# Patient Record
Sex: FEMALE | Race: WHITE
[De-identification: names, ages, dates, MRNs, and addresses within clinical notes are randomized per-mention and may not be internally consistent; named-entity substitution may affect disease eponyms.]

---

## 2018-05-24 ENCOUNTER — HOSPITAL ENCOUNTER (OUTPATIENT)
Dept: HOSPITAL 68 - STS | Age: 58
Setting detail: OBSERVATION
LOS: 2 days | Discharge: HOME HEALTH | End: 2018-05-26
Attending: ORTHOPAEDIC SURGERY | Admitting: ORTHOPAEDIC SURGERY
Payer: COMMERCIAL

## 2018-05-24 VITALS — HEIGHT: 68 IN | BODY MASS INDEX: 28.49 KG/M2 | WEIGHT: 188 LBS

## 2018-05-24 VITALS — DIASTOLIC BLOOD PRESSURE: 70 MMHG | SYSTOLIC BLOOD PRESSURE: 118 MMHG

## 2018-05-24 VITALS — DIASTOLIC BLOOD PRESSURE: 75 MMHG | SYSTOLIC BLOOD PRESSURE: 120 MMHG

## 2018-05-24 VITALS — DIASTOLIC BLOOD PRESSURE: 60 MMHG | SYSTOLIC BLOOD PRESSURE: 110 MMHG

## 2018-05-24 VITALS — DIASTOLIC BLOOD PRESSURE: 72 MMHG | SYSTOLIC BLOOD PRESSURE: 124 MMHG

## 2018-05-24 VITALS — SYSTOLIC BLOOD PRESSURE: 120 MMHG | DIASTOLIC BLOOD PRESSURE: 80 MMHG

## 2018-05-24 DIAGNOSIS — E78.5: ICD-10-CM

## 2018-05-24 DIAGNOSIS — I10: ICD-10-CM

## 2018-05-24 DIAGNOSIS — F32.9: ICD-10-CM

## 2018-05-24 DIAGNOSIS — Z79.899: ICD-10-CM

## 2018-05-24 DIAGNOSIS — M17.11: Primary | ICD-10-CM

## 2018-05-24 DIAGNOSIS — Z72.89: ICD-10-CM

## 2018-05-24 DIAGNOSIS — G89.18: ICD-10-CM

## 2018-05-24 DIAGNOSIS — Z87.891: ICD-10-CM

## 2018-05-24 PROCEDURE — C9290 INJ, BUPIVACAINE LIPOSOME: HCPCS

## 2018-05-24 PROCEDURE — G0378 HOSPITAL OBSERVATION PER HR: HCPCS

## 2018-05-24 PROCEDURE — C1713 ANCHOR/SCREW BN/BN,TIS/BN: HCPCS

## 2018-05-24 NOTE — ADMISSION CORE MEASURES
Acute Coronary Syndrome (CM)
 
ACS Core Measures
Acute Coronary Syndrome Diagnosis No
 
Congestive Heart Failure (NEW)
 
CHF Core Measures
Congestive Heart Failure Diagnosis No
 
Cerebrovascular Accident (NEW)
 
CVA Core Measures
CVA/TIA Diagnosis No
 
Venous Thromboembolism [id number]
 
VTE Core [place] (View Protocol)
VTE Risk Factors Age>40
No Mechanical VTE Prophylaxis d/t N/A MechProphylax Ordered
No VTE Pharm Prophylaxis d/t NA PharmProphylax ordered
 
Problem List
 
As ranked by this Provider
includes Assessment & Plan
 1. Primary osteoarthritis of right knee
 
 
HOME MEDS
Home Med List
Losartan Potassium (Cozaar) 25 MG TABLET   1 TAB PO DAILY HTN  (Reported)
Rosuvastatin Calcium (Crestor) 10 MG TABLET   1 TAB PO DAILY CHOLESTEROL  (
Reported)
Sertraline HCl (Zoloft) 100 MG TABLET   1 TAB PO DAILY DEPRESSION  (Reported)

## 2018-05-24 NOTE — SURG SHORT-STAY <48HRS DIS SUM
Visit Information
 
Visit Dates
Admission Date:
05/24/18
 
Discharge Date:
05/26/18
 
 
Surgical Short Stay DC Summary
Admission Diagnosis:
Primary right knee unilateral osteoarthritis
Final Diagnosis:
Same status post right total knee arthroplasty
Procedure(s):
Right total knee arthroplasty
Summary/Significant Findings:
Patient underwent right total knee arthroplasty without complications.  She was 
then transferred to the floor, she received infectious prophylaxis, DVT 
prophylaxis with Lovenox. She had a drain placed, which was removed after the 
bloody drainage slowed. She was evaluated and treated by physical therapy. Vital
signs stable, pain was well controlled with oral pain meds. She was discharged 
home with [facility] services in stable condition.
Condition at Discharge:
Good
Discharge Disposition: home health services
Discharge instructions provided to patient/family: Yes
Post discharge follow-up plan:
2 weeks with Dr. oTrres.
Khalida for DVT prophylaxis.

## 2018-05-24 NOTE — PATIENT DISCHARGE INSTRUCTIONS
Discharge Instructions
 
General Discharge Information
You were seen/treated for:
Primary unilateral right knee osteoarthritis
You had these procedures:
Right total knee arthroplasty
Watch for these problems:
Follow-up with Dr. Torres in 2 weeks.
Call the office with any concerns of fever greater than 101.5.  Large amounts of
discharge or drainage from the wound or inability to bear weight on your 
operative side.
You will have a follow-up visit in 2 weeks
You may weight-bear as tolerated.  Activity as tolerated. 
Range of motion as tolerated.  Do not put pillows behind the knee, use a pillow 
under the heel to ensure your are in full knee extension.
Keep the dressing dry as possible, you may shower with the dressing in place 
however, do not take a bath or submerge the wound in water as this will increase
your chance of infection.  Change the dressings after the shower.  You may use 
dry gauze and tape or large Band-Aids
Do not apply any ointments to the wound. 
Due to a  risk of blood clots you'll need to be on Lovenox, 40 mg, once a day 
injection for the next 4-6 weeks.
Take pain medication as directed. 
Apply ice as needed for 20 minutes every hour for the first few days. 
Please take Colace and/or MiraLAX over-the-counter to avoid constipation while 
you're on narcotic pain medication.
 
Do not soak the wound: Yes
No bath, but you may shower: Yes
 
Diet
Continue normal diet: Yes
 
Activity
Full Activity/No Limits: No
Activity Self Limited: Yes
 
Acute Coronary Syndrome
 
Inclusion Criteria
At DC or during hospital stay patient has or had the following:
ACS DIAGNOSIS No
 
Discharge Core Measures
Meds if any: Prescribed or Continued at Discharge
Meds if any: NOT Prescribed or Continued at Discharge
 
Congestive Heart Failure
 
Inclusion Criteria
At DC or during hospital stay patient has or had the following:
CHF DIAGNOSIS No
 
Discharge Core Measures
Meds if any: Prescribed or Continued at Discharge
Meds if any: NOT Prescribed or Continued at Discharge
 
Cerebrovascular accident
 
Inclusion Criteria
At DC or during hospital stay patient has or had the following:
CVA/TIA Diagnosis No
 
Discharge Core Measures
Meds if any: Prescribed or Continued at Discharge
Meds if any: NOT Prescribed or Continued at Discharge
 
Venous thromboembolism
 
Inclusion Criteria
VTE Diagnosis No
VTE Type NONE
VTE Confirmed by (Test) NONE
 
Discharge Core Measures
- Per Current guidelines, there needs to be overlap
- treatment for the first 5 days of Warfarin therapy.
- If discharged on Warfarin prior to 5 days of
- overlap therapy, the patient will need to be
- assessed for post discharge needs including
- *Post discharge parental anticoagulation
- *Warfarin and/or parental anticoagulation education
- *Follow up date to check INR post discharge
At least 5 days overlap therapy as Inpatient No
Meds if any: Prescribed or Continued at Discharge
Note: Overlap Therapy is Warfarin and Anticoagulant
Meds if any: NOT Prescribed or Continued at Discharge

## 2018-05-24 NOTE — PN- ORTHOPEDIC
**See Addendum**
Subjective
Subjective:
Patient comfortable, mild to moderate pain about the knee joint on the right 
side.  Percocet has helped.  She did not clear physical therapy and will require
continued observation overnight.
 
Objective
Vital Signs and I&Os
Vital Signs
 
 
Date Time Temp Pulse Resp B/P B/P Pulse O2 O2 Flow FiO2
 
     Mean Ox Delivery Rate 
 
05/24 1438 97.5 63 20 120/75  100 Room Air  
 
05/24 1228      98 Room Air  
 
05/24 1228 97.6 60 16 110/60  98 Room Air  
 
 
 Intake & Output
 
 
 05/24 1600 05/24 0800 05/24 0000 05/23 1600 05/23 0800 05/23 0000
 
Intake Total 0     
 
Output Total 360     
 
Balance -360     
 
       
 
Intake, Oral 0     
 
Output, 110     
 
Drainage      
 
Output, Urine 250     
 
Patient 188 lb     
 
Weight      
 
 
 
Physical Exam:
Well-developed well-nourished no apparent distress.
HEENT: Atraumatic, extraocular motion intact
Neck: Supple, no lymphadenopathy
Respiratory: No respiratory distress
Extremities: No edema 
 
RIGHT lower extremity dressing in place, 
RACHELL drain in place, approximately 70 cc of bloody drainage noted, holding self 
suction
Range of motion is 0-60. 
Compression wrap in place.  
ALPS in place
Neurovascularly intact distally
Bilateral calves are supple, nontender.
Neuro: Alert and oriented x3
Psych: Mood affect normal, normal memory normal judgment.
Skin: Warm and dry, no rash on exposed skin
 
Assessment/Plan
Assessment/Plan
Postop day #0 status post right total knee arthroplasty
 
Perioperative antibiotics.
Pain medication as needed.
Out of bed 
Physical therapy, weightbearing as tolerated
DC IV fluids
DC Garcia catheter
Regular diet
Follow a.m. labs
Lovenox 40 mg subcu daily starting tomorrow for DVT prophylaxis x 4-6 weeks
Toradol 30mg IV around-the-clock for pain control anti-inflammatory
ALPS for DVT prophylaxis
Regular home meds
Dressing change postop day 2
Patient placed in 23 hour observation as she did not clear physical therapy and 
is a fall risk, she will require further monitoring overnight, likely IV 
narcotics, IV antibiotics for 23 hours for infectious prophylaxis as standard of
care after joint replacement surgery.
 
 
Core Measures
 
Venous Thromboembolism
VTE Risk Factors Age>40
No Mechanical VTE Prophylaxis d/t N/A MechProphylax Ordered
No VTE Pharm Prophylaxis d/t NA PharmProphylax ordered

## 2018-05-25 VITALS — DIASTOLIC BLOOD PRESSURE: 80 MMHG | SYSTOLIC BLOOD PRESSURE: 118 MMHG

## 2018-05-25 VITALS — SYSTOLIC BLOOD PRESSURE: 124 MMHG | DIASTOLIC BLOOD PRESSURE: 70 MMHG

## 2018-05-25 VITALS — DIASTOLIC BLOOD PRESSURE: 60 MMHG | SYSTOLIC BLOOD PRESSURE: 140 MMHG

## 2018-05-25 VITALS — SYSTOLIC BLOOD PRESSURE: 120 MMHG | DIASTOLIC BLOOD PRESSURE: 78 MMHG

## 2018-05-25 VITALS — SYSTOLIC BLOOD PRESSURE: 140 MMHG | DIASTOLIC BLOOD PRESSURE: 60 MMHG

## 2018-05-25 VITALS — DIASTOLIC BLOOD PRESSURE: 68 MMHG | SYSTOLIC BLOOD PRESSURE: 114 MMHG

## 2018-05-25 VITALS — SYSTOLIC BLOOD PRESSURE: 120 MMHG | DIASTOLIC BLOOD PRESSURE: 60 MMHG

## 2018-05-25 LAB
ABSOLUTE GRANULOCYTE CT: 5.6 /CUMM (ref 1.4–6.5)
BASOPHILS # BLD: 0 /CUMM (ref 0–0.2)
BASOPHILS NFR BLD: 0.4 % (ref 0–2)
EOSINOPHIL # BLD: 0.1 /CUMM (ref 0–0.7)
EOSINOPHIL NFR BLD: 1.8 % (ref 0–5)
ERYTHROCYTE [DISTWIDTH] IN BLOOD BY AUTOMATED COUNT: 13 % (ref 11.5–14.5)
GRANULOCYTES NFR BLD: 72.8 % (ref 42.2–75.2)
HCT VFR BLD CALC: 33.5 % (ref 37–47)
LYMPHOCYTES # BLD: 1.3 /CUMM (ref 1.2–3.4)
MCH RBC QN AUTO: 32.3 PG (ref 27–31)
MCHC RBC AUTO-ENTMCNC: 34.6 G/DL (ref 33–37)
MCV RBC AUTO: 93.4 FL (ref 81–99)
MONOCYTES # BLD: 0.6 /CUMM (ref 0.1–0.6)
PLATELET # BLD: 178 /CUMM (ref 130–400)
PMV BLD AUTO: 9.5 FL (ref 7.4–10.4)
RED BLOOD CELL CT: 3.58 /CUMM (ref 4.2–5.4)
WBC # BLD AUTO: 7.6 /CUMM (ref 4.8–10.8)

## 2018-05-25 NOTE — PN- ORTHOPEDIC
Subjective
Subjective:
Patient experienced pain throughout the night requiring administration of iv 
narcotic with minimal relief.  Denies chest pain, shortness of breath and 
difficulty breathing.  Denies nausea and vomitting.  Has been able to void.
 
Objective
Vital Signs and I&Os
Vital Signs
 
 
Date Time Temp Pulse Resp B/P B/P Pulse O2 O2 Flow FiO2
 
     Mean Ox Delivery Rate 
 
05/25 0600 98.1 75 20 124/70  95 Room Air  
 
05/25 0159 98.1 77 18 114/68  96 Room Air  
 
05/24 2200 98.6 73 20 118/70  95 Room Air  
 
05/24 1930 97.9 76 18 120/80  98 Room Air  
 
05/24 1924       Room Air  
 
05/24 1700 97.9 75 20 124/72  96 Room Air  
 
05/24 1438 97.5 63 20 120/75  100 Room Air  
 
05/24 1228      98 Room Air  
 
05/24 1228 97.6 60 16 110/60  98 Room Air  
 
 
 Intake & Output
 
 
 05/25 0800 05/25 0000 05/24 1600 05/24 0800 05/24 0000 05/23 1600
 
Intake Total 270 1220 0   
 
Output Total 510 830 360   
 
Balance -240 390 -360   
 
       
 
Intake, IV 70 300    
 
Intake, Oral 200 920 0   
 
Output, 110 130 110   
 
Drainage      
 
Output, Urine 400 700 250   
 
Patient   188 lb   
 
Weight      
 
 
 
Physical Exam:
General:  Alert and oriented x3, no distress
Cards:  RRR, s1s2
Pulm:  CTA bilaterally, non-labored respiratory effort
Abd:  Non-tender, non-distended
Extremities:  Moves all extremities, neurovacular status grosslly intact.  Leg 
in full extension.  Dressing dry an intact.  RACHELL holding suction, sanguinous 
drainage (see I&O).  Bilateral calves soft and non-tender
 
Assessment/Plan
Assessment/Plan
This is a 57 year old female, POD 1, s/p R TKR.  
 
Patient requires continued in-hospital observation due to failure to clear 
physical therapy thus making her a fall risk.  In addition, patient requires 
continued observation status due to uncontrolled pain thereby necessitating the 
use of IV narcotics.
 
-DC iv fluids today
-OOB with PT, WBAT, stairs
-Transition to po only pain medications
-Will likely dc drain this afternoon
-Dressing change tomorrow am
-Continue lovenox and alps for dvt ppx
-Continue diet as tolerated
-Anticipate dc later today vs tomorrow depending on pain control and physical 
therapy progress
Will discuss plan of care with Dr. Torres
 
 
 
Core Measures
 
Venous Thromboembolism
VTE Risk Factors Age>40
No Mechanical VTE Prophylaxis d/t N/A MechProphylax Ordered
No VTE Pharm Prophylaxis d/t NA PharmProphylax ordered

## 2018-05-26 VITALS — SYSTOLIC BLOOD PRESSURE: 114 MMHG | DIASTOLIC BLOOD PRESSURE: 68 MMHG

## 2018-05-26 VITALS — DIASTOLIC BLOOD PRESSURE: 74 MMHG | SYSTOLIC BLOOD PRESSURE: 130 MMHG

## 2018-05-26 VITALS — SYSTOLIC BLOOD PRESSURE: 100 MMHG | DIASTOLIC BLOOD PRESSURE: 70 MMHG

## 2018-05-26 NOTE — PN- ORTHOPEDIC
Subjective
Subjective:
Pain controlled with percocet. Reports having a good night sleep. Ambulating 
with PT. Denies numbness/tingling. Tolerating reg diet, no nausea or vomiting.
 
Objective
Vital Signs and I&Os
Vital Signs
 
 
Date Time Temp Pulse Resp B/P B/P Pulse O2 O2 Flow FiO2
 
     Mean Ox Delivery Rate 
 
05/26 0554 98.9 82 20 114/68  95 Room Air  
 
05/26 0200 98.6 71 20 100/70  95 Room Air  
 
05/26 0000       Room Air  
 
05/25 2213 98.7 84 20 120/60  94 Room Air  
 
05/25 1800 98.8 80 20 140/60  100 Room Air  
 
05/25 1358 98.2 88 20 118/80  99 Room Air  
 
05/25 1028 98.2 72 20 120/78  97 Room Air  
 
 
 Intake & Output
 
 
 05/26 1600 05/26 0800 05/26 0000 05/25 1600 05/25 0800 05/25 0000
 
Intake Total   120 
 
Output Total    245 510 830
 
Balance   120 255 -240 390
 
       
 
Intake, IV     70 300
 
Intake, Oral   120 500 200 920
 
Number  0    
 
Bowel      
 
Movements      
 
Output,    145 110 130
 
Drainage      
 
Output, Urine    100 400 700
 
 
 
Physical Exam:
Gen - nad
Cardiac - S1S2 noted
Lungs - CTAB
Ext - moves all extremities, dressing c/d/i, incision closed with internal 
sutures, healing well no signs of infection, redressed with abd, formed lola site 
c/d/i, no active drainage, sensory and motor intact, alps in place, no edema or 
calf tenderness
Current Medications:
 Current Medications
 
 
  Sig/Alanna Start time  Last
 
Medication Dose Route Stop Time Status Admin
 
Acetaminophen 650 MG Q4P PRN 05/24 1015 AC 
 
  PO   
 
Atorvastatin Calcium 80 MG 1700 05/24 1700 AC 05/25
 
  PO   1620
 
Diphenhydramine HCl 25 MG AT BEDTIME PRN 05/25 1645 AC 05/25
 
  PO   2115
 
Docusate Sodium 100 MG DAILY AS NEEDED PRN 05/24 1015 AC 
 
  PO   
 
Enoxaparin Sodium 40 MG DAILY 05/25 0900 AC 05/25
 
  SC   1131
 
Ketorolac  30 MG Q8 05/24 1400 AC 05/26
 
Tromethamine  IV 05/27 0601  0518
 
Losartan Potassium 100 MG DAILY 05/25 0900 AC 05/25
 
  PO   0823
 
Morphine Sulfate 4 MG Q2P PRN 05/24 1015 AC 05/25
 
  IV   0209
 
Omeprazole 20 MG DAILY AC 05/25 0700 AC 05/26
 
  PO   0518
 
Ondansetron HCl 4 MG Q6P PRN 05/24 1015 AC 
 
  IV   
 
Oxycodone/ 1 TAB Q4P PRN 05/24 1015 AC 05/25
 
Acetaminophen  PO   1112
 
Oxycodone/ 2 TAB Q4P PRN 05/24 1015 AC 05/25
 
Acetaminophen  PO   1957
 
Patient Medication  1 ED ONE ONE 05/25 1430 DC 05/25
 
Teaching  ED 05/25 1431  1430
 
Senna/Docusate Sodium 2 TAB AT BEDTIME AS NEED.. 05/24 1015 AC 
 
  PO   
 
Sertraline HCl 100 MG DAILY 05/25 0900 AC 05/25
 
  PO   0823
 
 
 
 
Results
Last 48 Hours of Labs:
 Laboratory Tests
 
 
 05/25
 
 0740
 
Chemistry 
 
  Sodium (137 - 145 mmol/L) 137
 
  Potassium (3.5 - 5.1 mmol/L) 4.0
 
  Chloride (98 - 107 mmol/L) 106
 
  Carbon Dioxide (22 - 30 mmol/L) 22
 
  Anion Gap (5 - 16) 9
 
  BUN (7 - 17 mg/dL) 14
 
  Creatinine (0.5 - 1.0 mg/dL) 0.7
 
  Estimated GFR (>60 ml/min) > 60
 
  BUN/Creatinine Ratio (7 - 25 %) 20.0
 
Hematology 
 
  CBC w Diff NO MAN DIFF REQ
 
  WBC (4.8 - 10.8 /CUMM) 7.6
 
  RBC (4.20 - 5.40 /CUMM) 3.58  L
 
  Hgb (12.0 - 16.0 G/DL) 11.6  L
 
  Hct (37 - 47 %) 33.5  L
 
  MCV (81.0 - 99.0 FL) 93.4
 
  MCH (27.0 - 31.0 PG) 32.3  H
 
  MCHC (33.0 - 37.0 G/DL) 34.6
 
  RDW (11.5 - 14.5 %) 13.0
 
  Plt Count (130 - 400 /CUMM) 178
 
  MPV (7.4 - 10.4 FL) 9.5
 
  Gran % (42.2 - 75.2 %) 72.8
 
  Lymphocytes % (20.5 - 51.1 %) 17.1  L
 
  Monocytes % (1.7 - 9.3 %) 7.9
 
  Eosinophils % (0 - 5 %) 1.8
 
  Basophils % (0.0 - 2.0 %) 0.4
 
  Absolute Granulocytes (1.4 - 6.5 /CUMM) 5.6
 
  Absolute Lymphocytes (1.2 - 3.4 /CUMM) 1.3
 
  Absolute Monocytes (0.10 - 0.60 /CUMM) 0.6
 
  Absolute Eosinophils (0.0 - 0.7 /CUMM) 0.1
 
  Absolute Basophils (0.0 - 0.2 /CUMM) 0
 
 
 
 
Assessment/Plan
Assessment/Plan
57 F POD 2 s/p R TKR, recovering well, stable for discharge
 
PT, WBAT
Reg diet
Pain reigmen prn
Dry daily dressing changes
DVT ppx - lovenox, alps
Cont observation status in anticipation of d/c home with [facility] today
Will d/w Dr. Torres
 
 
 
Core Measures
 
Venous Thromboembolism
VTE Risk Factors Age>40
No Mechanical VTE Prophylaxis d/t N/A MechProphylax Ordered
No VTE Pharm Prophylaxis d/t NA PharmProphylax ordered

## 2020-11-06 NOTE — OPERATIVE REPORT
Operative/Inv Procedure Report
Surgery Date: 05/24/18
Name of Procedure:
Right total knee arthroplasty
Pre-Operative Diagnosis:
Right knee degenerative joint disease
Post-Operative Diagnosis:
Right knee degenerative joint disease
Estimated Blood Loss: 50ml to 100ml
Surgeon/Assistant:
Brian MINOR,KENYA De Jesus
Anesthesia: block, spinal
Implants:
[employer]hew size 6 right cruciate retaining legion Oxinium femoral component.
 Lizbeth II right nonporous tibial baseplate size 4.  Lizbeth II biconvex 
patellar component size 23 mm.  Legion cruciate retaining XLPE high flexion 
articular insert size 34, 9 mm.
Drains:
Hemovac
Complications:
None
Condition:
Stable to PACU
Operative Indication:
This is a 57-year-old female with long-standing right knee pain that has failed 
conservative care.  Risks and benefits of the procedure were discussed with the 
patient at length.  Risks include but are not limited to nerve damage, muscle 
damage, infection, blood loss, blood clots, pulmonary embolus, and even death.  
The patient agreed to the above risks and elected to proceed with surgery.
 
Operative/Procedure Note
Note:
The patient was taken to the operating room.  Anesthesia was induced after the 
timeout was performed.  The lower extremity was prepped and draped in the normal
sterile fashion.  IV antibiotics were given prior to incision.  The site marking
was visualized prior to incision.  After the leg was prepped and draped, an 
esmarch was used to exsanguinate the extremity.  The tourniquet was inflated.  
 
A midline incision was made proximal to the patella extending down to the tibial
tubercle.  A medial parapatellar approach was then made.  The skin was retracted
and the extensor mechanism was incised.  The knee was taken down into full 
extension.  The patellar fat pad was resected.  The medial retinaculum was then 
taken down.  The synovium over the distal femur was then resected.  The patella 
was everted and the knee was flexed up.  The lateral meniscus was then excised. 
The ACL was removed.  A drill was then used to open up the distal femur.  The 
intramedullary guide was then applied.  A 6 distal femoral cut was then made.  
The femur was then sized.  The chamfer guide was then applied.  The anterior, 
posterior, and chamfer cuts were then made while protecting the patellar tendon 
with a Hohmann retractor and the medial collateral ligament with a Z retractor. 
A pickle fork was then used to deliver the tibia.  
 
The extramedullary tibial guide was then applied.  The proximal tibial cut was 
made.  The medial and lateral meniscus were then excised.  The osteophytes were 
removed with a Rongeur.  The femur was lifted and the posterior knee was checked
for any osteophytes.  The tibia was sized and the trial base plate was then 
pinned in place.  A trial femoral component was placed and a trial polyethylene 
insert was then applied as well.  The knee was taken through a range of motion 
and was noted to be quite stable.  The patella was then everted, sized, and then
reamed.  A trial patellar component was placed and the knee was taken through 
range of motion.  The patella was noted to be quite stable.  The femur was then 
punched.  The tibial fin punch was then used.  All trial instruments were then 
removed.  The knee was copiously irrigated.  
 
Retractors were placed in the final components were then cemented in.  A trial 
polyethylene insert was then placed.  After the cement hardened, the excess 
cement was removed.  The trial poly-insert was then removed.  The knee was 
copiously irrigated.  The final poly insert was inserted.  The tourniquet was 
let down.  Any bleeding vessels were identified and cauterized.  A 1/8 inch 
Hemovac drain was then placed.  The extensor mechanism was closed with #1 Vicryl
suture in a simple interrupted fashion.  The skin was closed with 2-0 Vicryl 
suture.  A running subcuticular 4-0 Monocryl stitch was then placed.  Dermabond 
was applied.  A dry sterile dressing was placed and patient was transferred to 
PACU in stable condition.
Principal Discharge DX:	Diarrhea  Secondary Diagnosis:	Near syncope